# Patient Record
Sex: MALE | Race: WHITE | NOT HISPANIC OR LATINO | Employment: UNEMPLOYED | URBAN - METROPOLITAN AREA
[De-identification: names, ages, dates, MRNs, and addresses within clinical notes are randomized per-mention and may not be internally consistent; named-entity substitution may affect disease eponyms.]

---

## 2024-09-28 ENCOUNTER — OFFICE VISIT (OUTPATIENT)
Dept: URGENT CARE | Facility: URGENT CARE | Age: 39
End: 2024-09-28
Payer: COMMERCIAL

## 2024-09-28 VITALS
RESPIRATION RATE: 16 BRPM | BODY MASS INDEX: 35.56 KG/M2 | DIASTOLIC BLOOD PRESSURE: 70 MMHG | OXYGEN SATURATION: 97 % | HEART RATE: 91 BPM | WEIGHT: 277 LBS | SYSTOLIC BLOOD PRESSURE: 100 MMHG

## 2024-09-28 DIAGNOSIS — S61.012A LACERATION OF LEFT THUMB, FOREIGN BODY PRESENCE UNSPECIFIED, NAIL DAMAGE STATUS UNSPECIFIED, INITIAL ENCOUNTER: Primary | ICD-10-CM

## 2024-09-28 PROCEDURE — 99204 OFFICE O/P NEW MOD 45 MIN: CPT | Performed by: PHYSICIAN ASSISTANT

## 2024-09-28 RX ORDER — MIRTAZAPINE 30 MG/1
1 TABLET, FILM COATED ORAL AT BEDTIME
COMMUNITY
Start: 2024-07-25

## 2024-09-28 RX ORDER — BUSPIRONE HYDROCHLORIDE 15 MG/1
TABLET ORAL
COMMUNITY

## 2024-09-28 RX ORDER — ARIPIPRAZOLE 2 MG/1
TABLET ORAL
COMMUNITY

## 2024-09-28 RX ORDER — OLANZAPINE 20 MG/1
20 TABLET ORAL
COMMUNITY
Start: 2023-12-22

## 2024-09-28 RX ORDER — MIRTAZAPINE 15 MG/1
TABLET, FILM COATED ORAL
COMMUNITY
Start: 2024-04-24

## 2024-09-28 RX ORDER — MIRTAZAPINE 45 MG/1
TABLET, FILM COATED ORAL
COMMUNITY

## 2024-09-28 NOTE — PROGRESS NOTES
SUBJECTIVE:    Chief Complaint   Patient presents with    Laceration     Laceration-L middle finger and thumb from handheld chainsaw     Gabriel Coulter is a 38 year old male who presents to the clinic with a laceration on the left finger sustained 1 hour(s) ago.  Cut thumb and middle finger with chainsaw blade          Past Medical History:   Diagnosis Date    Anxiety     Depressive disorder      Current Outpatient Medications   Medication Sig Dispense Refill    ARIPiprazole (ABILIFY) 2 MG tablet       busPIRone (BUSPAR) 15 MG tablet       Lansoprazole (PREVACID PO)       mirtazapine (REMERON) 15 MG tablet       mirtazapine (REMERON) 30 MG tablet Take 1 tablet by mouth at bedtime.      mirtazapine (REMERON) 45 MG tablet       OLANZapine (ZYPREXA) 20 MG tablet Take 20 mg by mouth.      vortioxetine (TRINTELLIX) 10 MG tablet       CLONAZEPAM PO  (Patient not taking: Reported on 9/28/2024)       Social History     Tobacco Use    Smoking status: Never    Smokeless tobacco: Not on file   Substance Use Topics    Alcohol use: No       ROS:  Review of systems negative except as stated above.    OBJECTIVE:  /70   Pulse 91   Resp 16   Wt 125.6 kg (277 lb)   SpO2 97%   BMI 35.56 kg/m    GENERAL APPEARANCE: healthy, alert and no distress  RESP: lungs clear to auscultation - no rales, rhonchi or wheezes  CV: regular rates and rhythm, normal S1 S2, no murmur noted  NEURO: Normal strength and tone, sensory exam grossly normal,  normal speech and mentation  SKIN: multiple lacerations of thumb    ASSESSMENT:  (S61.012A) Laceration of left thumb, foreign body presence unspecified, nail damage status unspecified, initial encounter  (primary encounter diagnosis)  Comment: given character of tissue damage sent to ED for repair  Plan: unable to close in clinic. To ED

## 2025-04-04 ENCOUNTER — HOSPITAL ENCOUNTER (EMERGENCY)
Facility: CLINIC | Age: 40
Discharge: HOME | End: 2025-04-04
Attending: EMERGENCY MEDICINE | Admitting: EMERGENCY MEDICINE
Payer: COMMERCIAL

## 2025-04-04 VITALS
RESPIRATION RATE: 18 BRPM | HEIGHT: 74 IN | DIASTOLIC BLOOD PRESSURE: 88 MMHG | OXYGEN SATURATION: 97 % | HEART RATE: 82 BPM | TEMPERATURE: 98.4 F | WEIGHT: 280 LBS | SYSTOLIC BLOOD PRESSURE: 129 MMHG | BODY MASS INDEX: 35.94 KG/M2

## 2025-04-04 DIAGNOSIS — F30.10 MANIC BEHAVIOR (H): ICD-10-CM

## 2025-04-04 DIAGNOSIS — F99 INSOMNIA DUE TO OTHER MENTAL DISORDER: ICD-10-CM

## 2025-04-04 DIAGNOSIS — F51.05 INSOMNIA DUE TO OTHER MENTAL DISORDER: ICD-10-CM

## 2025-04-04 LAB
ATRIAL RATE - MUSE: 81 BPM
DIASTOLIC BLOOD PRESSURE - MUSE: NORMAL MMHG
INTERPRETATION ECG - MUSE: NORMAL
P AXIS - MUSE: 34 DEGREES
PR INTERVAL - MUSE: 158 MS
QRS DURATION - MUSE: 86 MS
QT - MUSE: 374 MS
QTC - MUSE: 434 MS
R AXIS - MUSE: -17 DEGREES
SYSTOLIC BLOOD PRESSURE - MUSE: NORMAL MMHG
T AXIS - MUSE: 21 DEGREES
VENTRICULAR RATE- MUSE: 81 BPM

## 2025-04-04 PROCEDURE — 93005 ELECTROCARDIOGRAM TRACING: CPT

## 2025-04-04 PROCEDURE — 99283 EMERGENCY DEPT VISIT LOW MDM: CPT

## 2025-04-04 PROCEDURE — 250N000013 HC RX MED GY IP 250 OP 250 PS 637: Performed by: EMERGENCY MEDICINE

## 2025-04-04 RX ORDER — OLANZAPINE 10 MG/1
10 TABLET, ORALLY DISINTEGRATING ORAL ONCE
Status: COMPLETED | OUTPATIENT
Start: 2025-04-04 | End: 2025-04-04

## 2025-04-04 RX ADMIN — OLANZAPINE 10 MG: 10 TABLET, ORALLY DISINTEGRATING ORAL at 19:43

## 2025-04-04 ASSESSMENT — COLUMBIA-SUICIDE SEVERITY RATING SCALE - C-SSRS
6. HAVE YOU EVER DONE ANYTHING, STARTED TO DO ANYTHING, OR PREPARED TO DO ANYTHING TO END YOUR LIFE?: NO
2. HAVE YOU ACTUALLY HAD ANY THOUGHTS OF KILLING YOURSELF IN THE PAST MONTH?: NO
1. IN THE PAST MONTH, HAVE YOU WISHED YOU WERE DEAD OR WISHED YOU COULD GO TO SLEEP AND NOT WAKE UP?: NO

## 2025-04-04 ASSESSMENT — ACTIVITIES OF DAILY LIVING (ADL)
ADLS_ACUITY_SCORE: 41

## 2025-04-05 NOTE — ED TRIAGE NOTES
"Pt here with wife for concerns of manic episode. Pt was hospitalized last for similar sx in 2023. Pt reports taking meds as prescribed. When pt asked why he is here he stated \"I'm having a psychotic break and think I'm brittny\". Pt unsure how long these symptoms have been present. Pt denies SI/HI/ SIB.    Triage Assessment (Adult)       Row Name 04/04/25 1903          Triage Assessment    Airway WDL WDL        Respiratory WDL    Respiratory WDL WDL        Cardiac WDL    Cardiac WDL WDL        Peripheral/Neurovascular WDL    Peripheral Neurovascular WDL WDL        Cognitive/Neuro/Behavioral WDL    Cognitive/Neuro/Behavioral WDL WDL                     "

## 2025-04-05 NOTE — ED NOTES
[facility]  ED to EMPATH Checklist:      Goal for EMPATH: [place]    Current Behavior: Calm and Cooperative    Safety Concerns: None    Legal Hold Status: Voluntary    Medically Cleared by ED provider: Yes    Patient Therapeutically Searched: Not searched - Currently in triage    Belongings: Remain with patient    Independent Ambulation at Baseline: Yes/No: Yes    Participates in Care/Conversation: Yes/No: Yes    Patient Informed about EMPATH: Yes/No: Yes    DEC: Ordered and pending    Patient Ready to be Transferred to EMPATH? Yes/No: Yes

## 2025-04-05 NOTE — ED NOTES
Patient and his wife up to desk, states he is feeling better and as discussed with Dr Mahan intend to go home and take medications as prescribed. Wife and patient states will return with any concerns.

## 2025-04-05 NOTE — ED PROVIDER NOTES
"  Emergency Department Note      History of Present Illness     Chief Complaint   Mental Health Problem      HPI   Gabriel Coulter is a 39 year old male with a history of atypical depression and anxiety who presents to the ED with his wife for evaluation of altered mental process. Patient reports having racing thoughts and delirium, but struggled to express anything acutely. His wife mentions that he has had a couple of dizzy spells recently. He has had fainting spells in the past, but these are typically one off occurrences. He denies suicidal ideation, homicidal ideation, auditory hallucinations, suicidal actions, substance ingestion, fever, cough, chest pain, vomiting, diarrhea, tachycardia, hematochezia, melena, loss of consciousness, or abdominal pain.     Independent Historian   Wife as detailed above.    Review of External Notes   I reviewed [facility]'s psychiatric hospital admission note from 12/9/23.  I reviewed the psychiatry E-visit note from 4/2/25, patient was prescribed Seroquel 12.5-25 mg that he has not yet started. Patient was evaluated for racing thoughts and difficulty sleeping.    Past Medical History     Medical History and Problem List   Anxiety  Depression     Medications   Abilify  Buspar  Trintellix   Seroquel   Remeron     Surgical History   [place] teeth extraction    Physical Exam     Patient Vitals for the past 24 hrs:   BP Temp Temp src Pulse Resp SpO2 Height Weight   04/04/25 1901 129/88 98.4  F (36.9  C) Temporal 82 18 97 % 1.88 m (6' 2\") 127 kg (280 lb)     Physical Exam  Nursing note and vitals reviewed.  Constitutional:  Appears well-developed and well-nourished.   HENT:   Head:    Atraumatic.   Mouth/Throat:   Oropharynx is clear and moist. No oropharyngeal exudate.   Eyes:    Pupils are equal, round, and reactive to light.   Neck:    Normal range of motion. Neck supple.      No tracheal deviation present. No thyromegaly present.   Cardiovascular:  Normal rate, regular " rhythm, no murmur   Pulmonary/Chest: Breath sounds are clear and equal without wheezes or crackles.  Abdominal:   Soft. Bowel sounds are normal. Exhibits no distension and      no mass. There is no tenderness.      There is no rebound and no guarding.   Musculoskeletal:  Exhibits no edema.   Lymphadenopathy:  No cervical adenopathy.   Psych:                         He is calm and cooperative.  Very pleasant.  Makes good eye contact.  Answers questions normally.  Very well-groomed.  Neurological:   Alert and oriented to person, place, and time.   Skin:    Skin is warm and dry. No rash noted. No pallor.       Diagnostics     Lab Results   Labs Ordered and Resulted from Time of ED Arrival to Time of ED Departure - No data to display    Imaging   No orders to display       EKG   ECG taken at 1913, ECG read at 1937  Normal sinus rhythm   Rate 81 bpm. RI interval 158 ms. QRS duration 86 ms. QT/QTc 374/434 ms. P-R-T axes 34 -17 21.    Independent Interpretation   None    ED Course      Medications Administered   Medications   OLANZapine zydis (zyPREXA) ODT tab 10 mg (10 mg Oral $Given 4/4/25 1943)       Procedures   Procedures     Discussion of Management   None    ED Course   ED Course as of 04/04/25 2222 Fri Apr 04, 2025 1912 I obtained the history and performed the examination as described.        Additional Documentation  None    Medical Decision Making / Diagnosis     CMS Diagnoses: None    MIPS       None    [facility]   Gabriel Coulter is a 39 year old male who arrives to the emergency department with his wife due to symptoms concerning for mata with racing thoughts and difficulty sleeping.  The initial plan was that he go to [facility] for further mental health evaluation, however [facility] was closed for the night.  The patient was given Zyprexa ODT and was feeling improved so he decided he did not want to wait in the emergency department all night to go to [facility].  He also did not want to wait for DEC evaluation.  The  patient was recently prescribed Seroquel which she has not started yet.  He states that he wants to start the Seroquel and see how his symptoms go.  He was told to return the [facility]  He was told to return anytime for further evaluation or if he feels his condition is worsening.  He was told to return as needed for any suicidal thoughts or thoughts of harming others.  I felt he was medically cleared and safe for discharge.  I did not feel that he met criteria for being placed on a 72-hour hold and he does not appear psychotic.    Disposition   The patient was discharged.     Diagnosis     ICD-10-CM    1. Manic behavior (H)  F30.10       2. Insomnia due to other mental disorder  F51.05     F99            Discharge Medications   Discharge Medication List as of 4/4/2025  9:53 PM            Scribe Disclosure:  I, Isaac Jamin, am serving as a scribe at 7:31 PM on 4/4/2025 to document services personally performed by Evie Mahan MD based on my observations and the provider's statements to me.        Evie Mahan MD  04/05/25 0056       Evie Mahan MD  04/05/25 0056       Evie Mahan MD  04/05/25 0100       Evie Mahan MD  04/08/25 1832